# Patient Record
(demographics unavailable — no encounter records)

---

## 2024-10-29 NOTE — PHYSICAL EXAM
[Chaperone Present] : A chaperone was present in the examining room during all aspects of the physical examination [Appropriately responsive] : appropriately responsive [Alert] : alert [No Acute Distress] : no acute distress [No Lymphadenopathy] : no lymphadenopathy [Soft] : soft [Non-tender] : non-tender [Non-distended] : non-distended [No HSM] : No HSM [No Lesions] : no lesions [No Mass] : no mass [Oriented x3] : oriented x3 [Examination Of The Breasts] : a normal appearance [No Discharge] : no discharge [No Masses] : no breast masses were palpable [Labia Majora] : normal [Labia Minora] : normal [Normal] : normal [Uterine Adnexae] : normal [No Tenderness] : no tenderness [Nl Sphincter Tone] : normal sphincter tone [[id number]] : A chaperone was present during the pelvic exam.

## 2024-10-29 NOTE — HISTORY OF PRESENT ILLNESS
[postmenopausal] : postmenopausal [Monogamous (Male Partner)] : is monogamous with a male partner [Patient reported mammogram was normal] : Patient reported mammogram was normal [N] : Patient is not sexually active [FreeTextEntry1] : 60-year-old postmenopausal female presents for annual gyn visit. Pt feels well and has no complaints. She denies PMB and abnormal discharge. Patient states her glucose is maintained and will have hemoglobin A1C checked in January. Pt reports she had Cologuard screening done.  Denies problems with urination. Pt c/o external vaginal dryness.  Of note, patient's partner comes and goes from [facility]. Patient is from [facility].  PHQ9: 0 [Mammogramdate] : 10/2024 [BreastSonogramDate] : 10/2024 [TextBox_19] : TC risk 5.4 % [BoneDensityDate] : 11/2023 [PapSmeardate] : 10/2023 [TextBox_37] : osteopenia [ColonoscopyDate] : 05/2022 [PGHxTotal] : 0

## 2024-10-29 NOTE — PLAN
[FreeTextEntry1] : -Referral given for bone density.  -Pap done today. -Rx for betamethasone sent to pharmacy. -RTO for annul 1 year.

## 2024-10-29 NOTE — DISCUSSION/SUMMARY
[FreeTextEntry1] : This note was written by Clifford Good on 10/29/2024 actively solely SUNITA Cortés M.D.     All medical record entries made by the Scribe were at my, SUNITA Cortés M.D. direction and personally dictated by me on 10/29/2024. I have personally reviewed the chart and agree that the record reflects my personal performance of the history, physical exam, assessment, and plan.

## 2025-01-16 NOTE — HEALTH RISK ASSESSMENT
[Very Good] : ~his/her~  mood as very good [Yes] : Yes [Monthly or less (1 pt)] : Monthly or less (1 point) [1 or 2 (0 pts)] : 1 or 2 (0 points) [Never (0 pts)] : Never (0 points) [No] : In the past 12 months have you used drugs other than those required for medical reasons? No [Little interest or pleasure doing things] : 1) Little interest or pleasure doing things [Feeling down, depressed, or hopeless] : 2) Feeling down, depressed, or hopeless [0] : 2) Feeling down, depressed, or hopeless: Not at all (0) [PHQ-2 Negative - No further assessment needed] : PHQ-2 Negative - No further assessment needed [Former] : Former [> 15 Years] : > 15 Years [No Retinopathy] : No retinopathy [None] : None [# of Members in Household ___] :  household currently consist of [unfilled] member(s) [Employed] : employed [High School] : high school [] :  [# Of Children ___] : has [unfilled] children [Feels Safe at Home] : Feels safe at home [Fully functional (bathing, dressing, toileting, transferring, walking, feeding)] : Fully functional (bathing, dressing, toileting, transferring, walking, feeding) [Fully functional (using the telephone, shopping, preparing meals, housekeeping, doing laundry, using] : Fully functional and needs no help or supervision to perform IADLs (using the telephone, shopping, preparing meals, housekeeping, doing laundry, using transportation, managing medications and managing finances) [Smoke Detector] : smoke detector [Carbon Monoxide Detector] : carbon monoxide detector [Safety elements used in home] : safety elements used in home [Seat Belt] :  uses seat belt [Patient/Caregiver not ready to engage] : , patient/caregiver not ready to engage [No falls in past year] : Patient reported no falls in the past year [HIV Test offered] : HIV Test offered [Hepatitis C test offered] : Hepatitis C test offered [de-identified] : no [de-identified] : rheumatologist, orthopedics, cardiologist [de-identified] : walking [Audit-CScore] : 1 [de-identified] : regular, low carb diet [[id number]] : 0 [EyeExamDate] : 02/14/2023 [Change in mental status noted] : No change in mental status noted [Language] : denies difficulty with language [Behavior] : denies difficulty with behavior [Learning/Retaining New Information] : denies difficulty learning/retaining new information [Handling Complex Tasks] : denies difficulty handling complex tasks [Sexually Active] : not sexually active [Reports changes in hearing] : Reports no changes in hearing [Reports changes in vision] : Reports no changes in vision [Reports changes in dental health] : Reports no changes in dental health [Sunscreen] : does not use sunscreen [MammogramDate] : 10/28/2024 [MammogramComments] : [facility]2 [PapSmearDate] : 10/29/2024 [PapSmearComments] : NILM [BoneDensityDate] : 11/08/2023 [BoneDensityComments] : Osteopenia of the spine T score -1.7(prior was -1.5), femoral neck T score -1.8, Hip T score -1.3 [ColonoscopyDate] : 05/27/2022 [ColonoscopyComments] : rectum is NL, had twisted sigmoid colon, had a coloquard in 06/20/2022 - negative [HIVDate] : 02/23/23 [HIVComments] : negative [HepatitisCDate] : 02/23/23 [HepatitisCComments] : negative [de-identified] : with  [FreeTextEntry2] : works as a  in the medical office

## 2025-01-16 NOTE — PLAN
[FreeTextEntry1] : Ms. ANGELIQUE NORIEGA 61 year female with a PMH DM2, hyperlipidemia, h/o pulmonary sarcoidosis, polyarticular arthritis h/o asthma, insomnia, osteopenia present to the office for a physical exam Well adult exam is performed. Recommend  to do a blood test today, further management will depend on the blood test results.  EKG was done and reviewed, NSR 65 bpm, no acute st -t changes HTN is well controlled, continue lisinopril 5 mg qd DM2 continue metformin 1000mg bid, actos 30mg qd For hyperlipidemia continue tricor, zocor, vascepa GERD continue protonix Insomnia continue xanax H/o sarcoidosis do a cxr F/u with GI  RTC to f/u in 2 wks. Patient is verbalized understanding

## 2025-01-16 NOTE — HISTORY OF PRESENT ILLNESS
[de-identified] : Ms. ANGELIQUE NORIEGA 61 year female with a PMH DM2, hyperlipidemia, h/o pulmonary sarcoidosis, polyarticular arthritis h/o asthma, insomnia, osteopenia present to the office for a physical exam.  Patient feels good, denies complaints at present time. As per patient  was seen by an orthopedics for a b/l shoulder pain, last week had b/l u/s quided monovisc injection(has h/o rotator cuff tear) Needs rx refill on xanax

## 2025-06-20 NOTE — HISTORY OF PRESENT ILLNESS
[de-identified] : ANGELIQUE NORIEGA is a 60-year-old RHD female presenting to the office for follow up evaluation of bilateral shoulder pain, R > L. Patient was previously seen on 01/10/2025 and received bilateral shoulder monovisc injections without any relief. Patient states overall her right shoulder is worsening in terms of pain, strength, and range of motion. Patient presents today interested in discussing possible surgical intervention for the right shoulder.   Previous history: Patient reports pain for several years in her right shoulder for several years and says that she began experiencing left shoulder pain recently. Patient denies injury or trauma to the area. The patient describes the pain as a dull aching, and occasionally sharp pain localized to the anterior aspect of both shoulders that is intermittent in nature. Patient states that the pain radiates down her arms. Her symptoms are exacerbated with any movement of the shoulder. Patient reports the pain is waking her up at night.  Patient reports associated weakness. Denies numbness and tingling in the upper extremity. Since her last visit on 09/18/2024, she received a cortisone injection to the right shoulder injection noting good symptom relief, but pain has since returned. Patient has been taking Tylenol for pain relief with good relief in symptoms.  Patient presents today for gel injections. Of note, patient has a hx of a right rotator cuff tear. Patient is diabetic. Last A1C was 5.9% on 02/27/2024. Patient sees a rheumatologist regularly and she is sero-negative.

## 2025-06-20 NOTE — HISTORY OF PRESENT ILLNESS
[de-identified] : ANGEILQUE NORIEGA is a 60-year-old RHD female presenting to the office for follow up evaluation of bilateral shoulder pain, R > L. Patient was previously seen on 01/10/2025 and received bilateral shoulder monovisc injections without any relief. Patient states overall her right shoulder is worsening in terms of pain, strength, and range of motion. Patient presents today interested in discussing possible surgical intervention for the right shoulder.   Previous history: Patient reports pain for several years in her right shoulder for several years and says that she began experiencing left shoulder pain recently. Patient denies injury or trauma to the area. The patient describes the pain as a dull aching, and occasionally sharp pain localized to the anterior aspect of both shoulders that is intermittent in nature. Patient states that the pain radiates down her arms. Her symptoms are exacerbated with any movement of the shoulder. Patient reports the pain is waking her up at night.  Patient reports associated weakness. Denies numbness and tingling in the upper extremity. Since her last visit on 09/18/2024, she received a cortisone injection to the right shoulder injection noting good symptom relief, but pain has since returned. Patient has been taking Tylenol for pain relief with good relief in symptoms.  Patient presents today for gel injections. Of note, patient has a hx of a right rotator cuff tear. Patient is diabetic. Last A1C was 5.9% on 02/27/2024. Patient sees a rheumatologist regularly and she is sero-negative.

## 2025-06-20 NOTE — DISCUSSION/SUMMARY
[de-identified] : The underlying pathophysiology was reviewed in great detail with the patient as well as the various treatment options, including ice, analgesics, NSAIDs, Physical therapy, steroid injections, hyaluronic acid injections, surgery - anatomic shoulder replacement versus reverse shoulder replacement  Discussed surgical intervention versus conservative management. The risks and benefits of a RIGHT shoulder replacement were reviewed and discussed in great detail. In addition to conservative management of physical therapy and home exercise program.   The patient wishes to proceed with ANATOMIC RIGHT SHOULDER REPLACEMENT at this time. The risks and benefits were discussed in great detail today, including but not limited to bleeding, infection, nerve injury, DVT, allergy to the anesthetic or to the implants, persistent pain, stiffness, scarring, swelling or deformity, re-tear  CT scan of right shoulder was ordered for preoperative evaluation. Information packet was provided to patient.  Activity modifications and restrictions were discussed. I advised avoiding overhead lifting. I advised the patient to work on good posture.  FU once patient schedules surgery.  All questions were answered, all alternatives discussed and the patient is in complete agreement with that plan. Follow-up appointment as instructed. Any issues and the patient will call or come in sooner.

## 2025-06-20 NOTE — PHYSICAL EXAM
[Normal RUE] : Right Upper Extremity: No scars, rashes, lesions, ulcers, skin intact [Normal LUE] : Left Upper Extremity: No scars, rashes, lesions, ulcers, skin intact [Normal Touch] : sensation intact for touch [Normal] : Alert and in no acute distress [de-identified] : Right Upper Extremity o Shoulder :  Inspection/Palpation : tender over the greater tuberosity, tender over the anterior and posterior glenohumeral joint lines, no swelling, no deformities  Range of Motion : ACTIVE FORWARD ELEVATION: Measured at 125 degrees, ACTIVE EXTERNAL ROTATION: Measured at 40 degrees, ACTIVE INTERNAL ROTATION: Measured at T12  Strength : external rotation 5/5, internal rotation 5/5, supraspinatus 5/5  Stability : no joint instability on provocative testing  Tests/Signs : Neer (+), Ragsdale (+) o Upper Arm : no tenderness, no swelling, no deformities o Muscle Bulk : no atrophy o Sensation : sensation intact to light touch o Skin : no skin rash or discoloration o Vascular Exam : no edema, no cyanosis, radial and ulnar pulses normal   Left Upper Extremity o Shoulder :  Inspection/Palpation : tender over the greater tuberosity, tender over the anterior and posterior glenohumeral joint lines, no swelling, no deformities  Range of Motion : ACTIVE FORWARD ELEVATION: Measured at 130 degrees, ACTIVE EXTERNAL ROTATION: Measured at 55 degrees, ACTIVE INTERNAL ROTATION: Measured at T9  Strength : external rotation 5/5, internal rotation 5/5, supraspinatus 5/5  Stability : no joint instability on provocative testing  Tests/Signs : Neer (+), Ragsdale (+) o Upper Arm : no tenderness, no swelling, no deformities o Muscle Bulk : no atrophy o Sensation : sensation intact to light touch o Skin : no skin rash or discoloration o Vascular Exam : no edema, no cyanosis, radial and ulnar pulses normal [de-identified] : _________________________ XR obtained on 06/20/2025 :  o Right Shoulder : Grashey, Axillary and Outlet views were obtained, there are no soft tissue abnormalities, no fractures, alignment is normal, severe glenohumeral osteoarthritis with inferior osteophyte formation, normal bone density, no bony lesions. _________________________ XR obtained on 06/20/2025 :  o Left Shoulder : Grashey, Axillary and Outlet views were obtained, there are no soft tissue abnormalities, no fractures, alignment is normal, severe glenohumeral osteoarthritis with inferior osteophyte formation, normal bone density, no bony lesions.    XRays Obtained on 01/31/2024:  o RIGHT Shoulder : Grashey, Axillary and Outlet views were obtained, there are no soft tissue abnormalities, no fractures, alignment is normal, severe glenohumeral osteoarthritis with bone on bone apposition and large inferior osteophyte formation off the humeral head, normal bone density, no bony lesions.  o LEFT Shoulder : Grashey, Axillary and Outlet views were obtained, there are no soft tissue abnormalities, no fractures, alignment is normal, moderate glenohumeral osteoarthritis, normal bone density, no bony lesions.

## 2025-06-20 NOTE — DISCUSSION/SUMMARY
[de-identified] : The underlying pathophysiology was reviewed in great detail with the patient as well as the various treatment options, including ice, analgesics, NSAIDs, Physical therapy, steroid injections, hyaluronic acid injections, surgery - anatomic shoulder replacement versus reverse shoulder replacement  Discussed surgical intervention versus conservative management. The risks and benefits of a RIGHT shoulder replacement were reviewed and discussed in great detail. In addition to conservative management of physical therapy and home exercise program.   The patient wishes to proceed with ANATOMIC RIGHT SHOULDER REPLACEMENT at this time. The risks and benefits were discussed in great detail today, including but not limited to bleeding, infection, nerve injury, DVT, allergy to the anesthetic or to the implants, persistent pain, stiffness, scarring, swelling or deformity, re-tear  CT scan of right shoulder was ordered for preoperative evaluation. Information packet was provided to patient.  Activity modifications and restrictions were discussed. I advised avoiding overhead lifting. I advised the patient to work on good posture.  FU once patient schedules surgery.  All questions were answered, all alternatives discussed and the patient is in complete agreement with that plan. Follow-up appointment as instructed. Any issues and the patient will call or come in sooner.

## 2025-06-20 NOTE — ADDENDUM
[FreeTextEntry1] : I, Jaxon Aranda, acted solely as a scribe for Dr. Blanco Mar on this date 06/20/2025.  All medical record entries made by the Scribe were at my, Dr. Blanco Mar, direction and personally dictated by me on 06/20/2025. I have reviewed the chart and agree that the record accurately reflects my personal performance of the history, physical exam, assessment and plan. I have also personally directed, reviewed, and agreed with the chart.